# Patient Record
Sex: FEMALE | Race: WHITE | NOT HISPANIC OR LATINO | URBAN - METROPOLITAN AREA
[De-identification: names, ages, dates, MRNs, and addresses within clinical notes are randomized per-mention and may not be internally consistent; named-entity substitution may affect disease eponyms.]

---

## 2017-03-03 ENCOUNTER — OUTPATIENT (OUTPATIENT)
Dept: OUTPATIENT SERVICES | Facility: HOSPITAL | Age: 52
LOS: 1 days | Discharge: HOME | End: 2017-03-03

## 2017-06-27 DIAGNOSIS — R92.8 OTHER ABNORMAL AND INCONCLUSIVE FINDINGS ON DIAGNOSTIC IMAGING OF BREAST: ICD-10-CM

## 2017-11-17 PROBLEM — Z00.00 ENCOUNTER FOR PREVENTIVE HEALTH EXAMINATION: Status: ACTIVE | Noted: 2017-11-17

## 2017-11-24 ENCOUNTER — OUTPATIENT (OUTPATIENT)
Dept: OUTPATIENT SERVICES | Facility: HOSPITAL | Age: 52
LOS: 1 days | Discharge: HOME | End: 2017-11-24

## 2017-11-24 ENCOUNTER — APPOINTMENT (OUTPATIENT)
Dept: HEMATOLOGY ONCOLOGY | Facility: CLINIC | Age: 52
End: 2017-11-24

## 2017-11-24 VITALS
HEART RATE: 73 BPM | RESPIRATION RATE: 14 BRPM | WEIGHT: 164 LBS | TEMPERATURE: 96.9 F | DIASTOLIC BLOOD PRESSURE: 90 MMHG | SYSTOLIC BLOOD PRESSURE: 121 MMHG | BODY MASS INDEX: 30.96 KG/M2 | HEIGHT: 61 IN

## 2017-11-24 DIAGNOSIS — I10 ESSENTIAL (PRIMARY) HYPERTENSION: ICD-10-CM

## 2017-11-24 DIAGNOSIS — Z80.8 FAMILY HISTORY OF MALIGNANT NEOPLASM OF OTHER ORGANS OR SYSTEMS: ICD-10-CM

## 2017-11-24 DIAGNOSIS — Z78.9 OTHER SPECIFIED HEALTH STATUS: ICD-10-CM

## 2017-11-24 DIAGNOSIS — K21.9 GASTRO-ESOPHAGEAL REFLUX DISEASE W/OUT ESOPHAGITIS: ICD-10-CM

## 2017-11-24 DIAGNOSIS — E78.00 PURE HYPERCHOLESTEROLEMIA, UNSPECIFIED: ICD-10-CM

## 2017-11-24 LAB
BASOPHILS # BLD: 0.03 TH/MM3
BASOPHILS NFR BLD: 0.5 %
EOSINOPHIL # BLD: 0.75 TH/MM3
EOSINOPHIL NFR BLD: 11.5 %
ERYTHROCYTE [DISTWIDTH] IN BLOOD BY AUTOMATED COUNT: 15.2 %
GRANULOCYTES # BLD: 2.88 TH/MM3
GRANULOCYTES NFR BLD: 43.9 %
HCT VFR BLD AUTO: 38.5 %
HGB BLD-MCNC: 12.4 G/DL
IMM GRANULOCYTES # BLD: 0.01 TH/MM3
IMM GRANULOCYTES NFR BLD: 0.2 %
LYMPHOCYTES # BLD: 2.18 TH/MM3
LYMPHOCYTES NFR BLD: 33.3 %
MCH RBC QN AUTO: 26.7 PG
MCHC RBC AUTO-ENTMCNC: 32.2 G/DL
MCV RBC AUTO: 83 FL
MONOCYTES # BLD: 0.69 TH/MM3
MONOCYTES NFR BLD: 10.6 %
PLATELET # BLD: 242 TH/MM3
PMV BLD AUTO: 10.2 FL
RBC # BLD AUTO: 4.64 MIL/MM3
WBC # BLD: 6.54 TH/MM3

## 2017-11-27 DIAGNOSIS — D47.2 MONOCLONAL GAMMOPATHY: ICD-10-CM

## 2017-11-27 DIAGNOSIS — E61.1 IRON DEFICIENCY: ICD-10-CM

## 2017-11-27 LAB
ALBUMIN MFR SERPL ELPH: 64.3 %
ALBUMIN SERPL ELPH-MCNC: 4.3 G/DL
ALBUMIN SERPL-MCNC: 4 G/DL
ALBUMIN/GLOB SERPL ELPH: 1.8 ZZ
ALBUMIN/GLOB SERPL: 2.11
ALP SERPL-CCNC: 74 IU/L
ALPHA1 GLOB MFR SERPL ELPH: 4.5 %
ALPHA1 GLOB SERPL ELPH-MCNC: 0.3 G/DL
ALPHA2 GLOB MFR SERPL ELPH: 10.3 %
ALPHA2 GLOB SERPL ELPH-MCNC: 0.7 G/DL
ALT SERPL-CCNC: 22 IU/L
ANION GAP SERPL CALC-SCNC: 8 MEQ/L
AST SERPL-CCNC: 21 IU/L
B-GLOBULIN SERPL ELPH-MCNC: 0.7 G/DL
BETA1 GLOB MFR SERPL ELPH: 10.7 %
BILIRUB SERPL-MCNC: 0.2 MG/DL
BUN SERPL-MCNC: 11 MG/DL
BUN/CREAT SERPL: 15.5 %
CALCIUM SERPL-MCNC: 10 MG/DL
CHLORIDE SERPL-SCNC: 106 MEQ/L
CO2 SERPL-SCNC: 31 MEQ/L
CREAT SERPL-MCNC: 0.71 MG/DL
GAMMA GLOB MFR SERPL ELPH: 10.2 %
GAMMA GLOB SERPL ELPH-MCNC: 0.7 G/DL
GFR SERPL CREATININE-BSD FRML MDRD: 86
GLUCOSE SERPL-MCNC: 107 MG/DL
IGA FLD-MCNC: 59 MG/DL
IGG FLD-MCNC: 681 MG/DL
IGM SERPL-MCNC: 32 MG/DL
INTERPRETATION SERPL IFE-IMP: NOT DETECTED
KAPPA LC FREE SER-MCNC: 18 MG/L
KAPPA LC FREE/LAMBDA FREE SER: 1.51
LACTATE DEHYDROGENASE (NORTH): 153 IU/L
LAMBDA LC FREE SERPL-MCNC: 11.9 MG/L
POTASSIUM SERPL-SCNC: 4.8 MMOL/L
PROT PATTERN SERPL ELPH-IMP: 6.7 G/DL
PROT PATTERN SERPL ELPH-IMP: NORMAL
PROT SERPL-MCNC: 5.9 G/DL
PROT SERPL-MCNC: 6.7 G/DL
SODIUM SERPL-SCNC: 145 MEQ/L

## 2017-11-29 LAB
PROT 24H UR-MRATE: 127.75 MG/24 HR
PROT UR-MCNC: 7 MG/DL
SPECIMEN VOL 24H UR: 1825 ML

## 2018-02-28 ENCOUNTER — OUTPATIENT (OUTPATIENT)
Dept: OUTPATIENT SERVICES | Facility: HOSPITAL | Age: 53
LOS: 1 days | Discharge: HOME | End: 2018-02-28

## 2018-03-01 DIAGNOSIS — N95.9 UNSPECIFIED MENOPAUSAL AND PERIMENOPAUSAL DISORDER: ICD-10-CM

## 2018-03-01 DIAGNOSIS — Z13.820 ENCOUNTER FOR SCREENING FOR OSTEOPOROSIS: ICD-10-CM

## 2018-03-05 ENCOUNTER — OUTPATIENT (OUTPATIENT)
Dept: OUTPATIENT SERVICES | Facility: HOSPITAL | Age: 53
LOS: 1 days | Discharge: HOME | End: 2018-03-05

## 2018-03-05 DIAGNOSIS — Z12.31 ENCOUNTER FOR SCREENING MAMMOGRAM FOR MALIGNANT NEOPLASM OF BREAST: ICD-10-CM

## 2018-03-15 ENCOUNTER — OUTPATIENT (OUTPATIENT)
Dept: OUTPATIENT SERVICES | Facility: HOSPITAL | Age: 53
LOS: 1 days | Discharge: HOME | End: 2018-03-15

## 2018-03-15 VITALS
TEMPERATURE: 97 F | OXYGEN SATURATION: 97 % | WEIGHT: 155.43 LBS | SYSTOLIC BLOOD PRESSURE: 138 MMHG | DIASTOLIC BLOOD PRESSURE: 75 MMHG | RESPIRATION RATE: 18 BRPM | HEIGHT: 61 IN | HEART RATE: 64 BPM

## 2018-03-15 DIAGNOSIS — G56.01 CARPAL TUNNEL SYNDROME, RIGHT UPPER LIMB: ICD-10-CM

## 2018-03-15 DIAGNOSIS — M65.311 TRIGGER THUMB, RIGHT THUMB: ICD-10-CM

## 2018-03-15 DIAGNOSIS — Z90.49 ACQUIRED ABSENCE OF OTHER SPECIFIED PARTS OF DIGESTIVE TRACT: Chronic | ICD-10-CM

## 2018-03-15 DIAGNOSIS — Z01.818 ENCOUNTER FOR OTHER PREPROCEDURAL EXAMINATION: ICD-10-CM

## 2018-03-15 DIAGNOSIS — Z98.890 OTHER SPECIFIED POSTPROCEDURAL STATES: Chronic | ICD-10-CM

## 2018-03-15 LAB
ALBUMIN SERPL ELPH-MCNC: 4 G/DL — SIGNIFICANT CHANGE UP (ref 3–5.5)
ALP SERPL-CCNC: 81 U/L — SIGNIFICANT CHANGE UP (ref 30–115)
ALT FLD-CCNC: 21 U/L — SIGNIFICANT CHANGE UP (ref 0–41)
ANION GAP SERPL CALC-SCNC: 11 MMOL/L — SIGNIFICANT CHANGE UP (ref 7–14)
APTT BLD: 30.6 SEC — SIGNIFICANT CHANGE UP (ref 27–39.2)
AST SERPL-CCNC: 19 U/L — SIGNIFICANT CHANGE UP (ref 0–41)
BILIRUB SERPL-MCNC: 0.5 MG/DL — SIGNIFICANT CHANGE UP (ref 0.2–1.2)
BUN SERPL-MCNC: 10 MG/DL — SIGNIFICANT CHANGE UP (ref 10–20)
CALCIUM SERPL-MCNC: 9.7 MG/DL — SIGNIFICANT CHANGE UP (ref 8.5–10.1)
CHLORIDE SERPL-SCNC: 104 MMOL/L — SIGNIFICANT CHANGE UP (ref 98–110)
CO2 SERPL-SCNC: 29 MMOL/L — SIGNIFICANT CHANGE UP (ref 17–32)
CREAT SERPL-MCNC: 0.8 MG/DL — SIGNIFICANT CHANGE UP (ref 0.7–1.5)
GLUCOSE SERPL-MCNC: 105 MG/DL — SIGNIFICANT CHANGE UP (ref 70–110)
HCT VFR BLD CALC: 39.2 % — SIGNIFICANT CHANGE UP (ref 37–47)
HGB BLD-MCNC: 12.8 G/DL — SIGNIFICANT CHANGE UP (ref 12–16)
INR BLD: 1.06 RATIO — SIGNIFICANT CHANGE UP (ref 0.65–1.3)
MCHC RBC-ENTMCNC: 27.1 PG — SIGNIFICANT CHANGE UP (ref 27–31)
MCHC RBC-ENTMCNC: 32.7 G/DL — SIGNIFICANT CHANGE UP (ref 32–37)
MCV RBC AUTO: 83.1 FL — SIGNIFICANT CHANGE UP (ref 81–99)
NRBC # BLD: 0 /100 WBCS — SIGNIFICANT CHANGE UP (ref 0–0)
PLATELET # BLD AUTO: 240 K/UL — SIGNIFICANT CHANGE UP (ref 130–400)
POTASSIUM SERPL-MCNC: 4.8 MMOL/L — SIGNIFICANT CHANGE UP (ref 3.5–5)
POTASSIUM SERPL-SCNC: 4.8 MMOL/L — SIGNIFICANT CHANGE UP (ref 3.5–5)
PROT SERPL-MCNC: 6.1 G/DL — SIGNIFICANT CHANGE UP (ref 6–8)
PROTHROM AB SERPL-ACNC: 11.5 SEC — SIGNIFICANT CHANGE UP (ref 9.95–12.87)
RBC # BLD: 4.72 M/UL — SIGNIFICANT CHANGE UP (ref 4.2–5.4)
RBC # FLD: 13.5 % — SIGNIFICANT CHANGE UP (ref 11.5–14.5)
SODIUM SERPL-SCNC: 144 MMOL/L — SIGNIFICANT CHANGE UP (ref 135–146)
WBC # BLD: 8.26 K/UL — SIGNIFICANT CHANGE UP (ref 4.8–10.8)
WBC # FLD AUTO: 8.26 K/UL — SIGNIFICANT CHANGE UP (ref 4.8–10.8)

## 2018-03-15 NOTE — H&P PST ADULT - REASON FOR ADMISSION
Pt denies cp palp uri cough dysuria or sob. ET 1 FOS denies SOB . PT denies any open wounds, drainage or rashes.scheduled for carpal; tunnel release and trigger release. pt had small bowel endoscopy 3/14/18 to follow up for hx of low iron levels. Pt denies cp palp uri cough dysuria or sob. ET 1 FOS denies SOB . PT denies any open wounds, drainage or rashes. scheduled for carpal  tunnel release and trigger release. pt had small bowel endoscopy 3/14/18 to follow up for hx of low iron levels.  pt was advised by surgeon clearance appt with md reaves and was advised by PAST to f/u regarding slight sore throat started 2 days ago. no fever or cough noted. pt compliant

## 2018-03-15 NOTE — H&P PST ADULT - PSH
H/O hysterectomy for benign disease    History of surgery  loomis abida placement  S/P cholecystectomy

## 2018-03-28 ENCOUNTER — OUTPATIENT (OUTPATIENT)
Dept: OUTPATIENT SERVICES | Facility: HOSPITAL | Age: 53
LOS: 1 days | Discharge: HOME | End: 2018-03-28

## 2018-03-28 VITALS
HEART RATE: 71 BPM | WEIGHT: 155.43 LBS | SYSTOLIC BLOOD PRESSURE: 149 MMHG | RESPIRATION RATE: 20 BRPM | OXYGEN SATURATION: 97 % | TEMPERATURE: 98 F | HEIGHT: 61 IN | DIASTOLIC BLOOD PRESSURE: 73 MMHG

## 2018-03-28 VITALS
RESPIRATION RATE: 17 BRPM | HEART RATE: 63 BPM | OXYGEN SATURATION: 98 % | SYSTOLIC BLOOD PRESSURE: 136 MMHG | DIASTOLIC BLOOD PRESSURE: 69 MMHG

## 2018-03-28 DIAGNOSIS — Z90.49 ACQUIRED ABSENCE OF OTHER SPECIFIED PARTS OF DIGESTIVE TRACT: Chronic | ICD-10-CM

## 2018-03-28 DIAGNOSIS — Z98.890 OTHER SPECIFIED POSTPROCEDURAL STATES: Chronic | ICD-10-CM

## 2018-03-28 RX ORDER — ONDANSETRON 8 MG/1
4 TABLET, FILM COATED ORAL ONCE
Qty: 0 | Refills: 0 | Status: DISCONTINUED | OUTPATIENT
Start: 2018-03-28 | End: 2018-04-12

## 2018-03-28 RX ORDER — IBUPROFEN 200 MG
1 TABLET ORAL
Qty: 20 | Refills: 0 | OUTPATIENT
Start: 2018-03-28

## 2018-03-28 RX ORDER — SODIUM CHLORIDE 9 MG/ML
1000 INJECTION, SOLUTION INTRAVENOUS
Qty: 0 | Refills: 0 | Status: DISCONTINUED | OUTPATIENT
Start: 2018-03-28 | End: 2018-04-12

## 2018-03-28 RX ORDER — ACETAMINOPHEN 500 MG
650 TABLET ORAL ONCE
Qty: 0 | Refills: 0 | Status: DISCONTINUED | OUTPATIENT
Start: 2018-03-28 | End: 2018-04-12

## 2018-03-28 RX ORDER — OXYCODONE AND ACETAMINOPHEN 5; 325 MG/1; MG/1
1 TABLET ORAL ONCE
Qty: 0 | Refills: 0 | Status: DISCONTINUED | OUTPATIENT
Start: 2018-03-28 | End: 2018-03-28

## 2018-03-28 RX ADMIN — SODIUM CHLORIDE 100 MILLILITER(S): 9 INJECTION, SOLUTION INTRAVENOUS at 08:36

## 2018-03-28 NOTE — ASU DISCHARGE PLAN (ADULT/PEDIATRIC). - SPECIAL INSTRUCTIONS
DIET:    Resume normal diet  No alcoholic  beverages for 24 hours or if on prescribed narcotic pain medications.    MEDICATION:    Resume your preoperative oral medications.  Check with your physician before starting aspirin, Coumadin, or other blood thinners.  Prescriptions given to you - take as directed.      ACTIVITY:    Rest today and limit your activities for 24 hours.  Do not drive or operate machinery for 24 hours - if you received anesthesia.  When taking pain medication, be careful as you walk or climb stairs.  It is not advisable to drive while taking prescribed pain medication.    SPECIAL INSTRUCTIONS:    _x____ Elevate operative site above heart level or as directed.  _x____ Apply ice to operative site as directed.  _____ Use  sling as directed.  ___x__ Exercise fingers.    DRESSING CARE:    ___x__ You may change the dressing 4 days. Keep wound covered with band-aids.  _____ Do not change the dressing until your doctor see you.  _____ You can loosen or rewrap the dressing.  _____  Keep dressing clean and dry.  _____ You may shower in _____ day(s) with the extremity covered by a plastic bag.  __x___ OK to wash hand , including showers, in ___4__ day(s).    ADDITIONAL  INFORMATION:    Post operative visit should be scheduled for next week.  If you are not aware of visit please contact office.  If you have any questions or concerns call office at      Notify your doctor if you develop   Fever  Excessive Swelling  Chills   Drainage   Pain not controlled by medication  Persistent numbness in hand or fingers    If an Emergency arises call 911 and/or go to the Emergency Room

## 2018-03-28 NOTE — PRE-ANESTHESIA EVALUATION ADULT - NSANTHOSAYNRD_GEN_A_CORE
No. NIDHI screening performed.  STOP BANG Legend: 0-2 = LOW Risk; 3-4 = INTERMEDIATE Risk; 5-8 = HIGH Risk

## 2018-03-28 NOTE — BRIEF OPERATIVE NOTE - POST-OP DX
Carpal tunnel syndrome of right wrist  03/28/2018    Active  Skinny Kern  Trigger thumb, right thumb  03/28/2018    Active  Skinny Kern

## 2018-03-28 NOTE — BRIEF OPERATIVE NOTE - PRE-OP DX
Carpal tunnel syndrome of right wrist  03/28/2018    Active  Skinny Kern  Trigger finger of right thumb  03/28/2018    Active  Skinny Kern

## 2018-03-28 NOTE — PACU DISCHARGE NOTE - COMMENTS
PACU vital signs are:  HR:        BP:    /       O2sat:    %     RR:        Temp:              Please discharge patient when criteria met. PACU vital signs are:  HR:   62     BP: 117   / 72      O2sat:  96  %     RR:18        Temp:  97.7            Please discharge patient when criteria met.

## 2018-03-28 NOTE — BRIEF OPERATIVE NOTE - PROCEDURE
<<-----Click on this checkbox to enter Procedure Release of right trigger thumb  03/28/2018    Active  NAJMA  Carpal tunnel release, right  03/28/2018    Active  NAJMA

## 2018-03-30 DIAGNOSIS — Z88.2 ALLERGY STATUS TO SULFONAMIDES: ICD-10-CM

## 2018-03-30 DIAGNOSIS — G56.01 CARPAL TUNNEL SYNDROME, RIGHT UPPER LIMB: ICD-10-CM

## 2018-03-30 DIAGNOSIS — M65.311 TRIGGER THUMB, RIGHT THUMB: ICD-10-CM

## 2018-03-30 DIAGNOSIS — I10 ESSENTIAL (PRIMARY) HYPERTENSION: ICD-10-CM

## 2018-10-25 PROBLEM — Z87.39 PERSONAL HISTORY OF OTHER DISEASES OF THE MUSCULOSKELETAL SYSTEM AND CONNECTIVE TISSUE: Chronic | Status: ACTIVE | Noted: 2018-03-15

## 2018-10-25 PROBLEM — D64.9 ANEMIA, UNSPECIFIED: Chronic | Status: ACTIVE | Noted: 2018-03-15

## 2018-10-25 PROBLEM — I10 ESSENTIAL (PRIMARY) HYPERTENSION: Chronic | Status: ACTIVE | Noted: 2018-03-15

## 2018-11-06 ENCOUNTER — APPOINTMENT (OUTPATIENT)
Dept: PLASTIC SURGERY | Facility: CLINIC | Age: 53
End: 2018-11-06
Payer: COMMERCIAL

## 2018-11-06 VITALS — HEIGHT: 60 IN | WEIGHT: 159 LBS | BODY MASS INDEX: 31.22 KG/M2

## 2018-11-06 DIAGNOSIS — D22.39 MELANOCYTIC NEVI OF OTHER PARTS OF FACE: ICD-10-CM

## 2018-11-06 DIAGNOSIS — Z86.69 PERSONAL HISTORY OF OTHER DISEASES OF THE NERVOUS SYSTEM AND SENSE ORGANS: ICD-10-CM

## 2018-11-06 PROCEDURE — 99203 OFFICE O/P NEW LOW 30 MIN: CPT

## 2018-11-30 ENCOUNTER — APPOINTMENT (OUTPATIENT)
Dept: PLASTIC SURGERY | Facility: CLINIC | Age: 53
End: 2018-11-30
Payer: COMMERCIAL

## 2018-11-30 PROCEDURE — 12051 INTMD RPR FACE/MM 2.5 CM/<: CPT | Mod: 59

## 2018-11-30 PROCEDURE — 11440 EXC FACE-MM B9+MARG 0.5 CM/<: CPT

## 2018-12-07 ENCOUNTER — APPOINTMENT (OUTPATIENT)
Dept: PLASTIC SURGERY | Facility: CLINIC | Age: 53
End: 2018-12-07
Payer: COMMERCIAL

## 2018-12-07 DIAGNOSIS — D48.5 NEOPLASM OF UNCERTAIN BEHAVIOR OF SKIN: ICD-10-CM

## 2018-12-07 PROCEDURE — 99024 POSTOP FOLLOW-UP VISIT: CPT

## 2019-01-22 ENCOUNTER — APPOINTMENT (OUTPATIENT)
Dept: UROLOGY | Facility: CLINIC | Age: 54
End: 2019-01-22
Payer: COMMERCIAL

## 2019-01-22 LAB
BILIRUB UR QL STRIP: NORMAL
CLARITY UR: CLEAR
COLLECTION METHOD: NORMAL
GLUCOSE UR-MCNC: NORMAL
HCG UR QL: NORMAL EU/DL
HGB UR QL STRIP.AUTO: NORMAL
KETONES UR-MCNC: NORMAL
LEUKOCYTE ESTERASE UR QL STRIP: 75
NITRITE UR QL STRIP: NORMAL
PH UR STRIP: 7
PROT UR STRIP-MCNC: NORMAL
SP GR UR STRIP: 1

## 2019-01-22 PROCEDURE — 99204 OFFICE O/P NEW MOD 45 MIN: CPT

## 2019-01-22 PROCEDURE — 81003 URINALYSIS AUTO W/O SCOPE: CPT | Mod: QW

## 2019-01-22 NOTE — PHYSICAL EXAM
[General Appearance - Well Developed] : well developed [General Appearance - Well Nourished] : well nourished [Normal Appearance] : normal appearance [Well Groomed] : well groomed [General Appearance - In No Acute Distress] : no acute distress [Edema] : no peripheral edema [Respiration, Rhythm And Depth] : normal respiratory rhythm and effort [Exaggerated Use Of Accessory Muscles For Inspiration] : no accessory muscle use [Abdomen Soft] : soft [Abdomen Tenderness] : non-tender [Costovertebral Angle Tenderness] : no ~M costovertebral angle tenderness [Normal Station and Gait] : the gait and station were normal for the patient's age [] : no rash [No Focal Deficits] : no focal deficits [Oriented To Time, Place, And Person] : oriented to person, place, and time [Affect] : the affect was normal [Mood] : the mood was normal [Not Anxious] : not anxious [No Palpable Adenopathy] : no palpable adenopathy [FreeTextEntry1] : deferred to gyn

## 2019-01-22 NOTE — HISTORY OF PRESENT ILLNESS
[Nocturia] : nocturia [None] : None [FreeTextEntry1] : 53 y.o female with h/o nephrolithiasis\par previously followed by Dr. Mauricio- now needs to change doctors due to insurance reasons\par pt asymptomatic at present time\par pt s/p left ESWL 11/21/18\par pt states she did not pass any fragments\par on thera- lith by Dr. Mauricio\par all data reviewed\par voiding well\par not \par no prior pregnancies\par not working\par \par states she saw a nephrologist for proteinuria but states she will not go back to that doctor\par all reports reviewed from Dr. Mauricio\par KUB- 11/30/18- (post left ESWL) few punctate stones bilaterally, up to 3mm in left kidney\par  [Urinary Urgency] : no urinary urgency [Urinary Frequency] : no urinary frequency [Weak Stream] : no weak stream [Dysuria] : no dysuria [Hematuria - Gross] : no gross hematuria [Abdominal Pain] : no abdominal pain [Flank Pain] : no flank pain [Fever] : no fever [Anorexia] : no anorexia

## 2019-01-22 NOTE — ASSESSMENT
[FreeTextEntry1] : 1. small bilateral nephrolithiasis- up to 3mm- s/p left ESWL\par \par -low Na+, diet\par -increase liquid intake\par -referral to Dr. Montoya or Dr. Fajardo for proteinuria and metabolic management\par -KUB in 12 months\par -rto 12 months\par

## 2019-04-03 ENCOUNTER — OUTPATIENT (OUTPATIENT)
Dept: OUTPATIENT SERVICES | Facility: HOSPITAL | Age: 54
LOS: 1 days | Discharge: HOME | End: 2019-04-03

## 2019-04-03 VITALS
HEIGHT: 61 IN | RESPIRATION RATE: 18 BRPM | DIASTOLIC BLOOD PRESSURE: 79 MMHG | WEIGHT: 160.06 LBS | HEART RATE: 57 BPM | OXYGEN SATURATION: 99 % | TEMPERATURE: 98 F | SYSTOLIC BLOOD PRESSURE: 148 MMHG

## 2019-04-03 VITALS
HEART RATE: 69 BPM | OXYGEN SATURATION: 96 % | DIASTOLIC BLOOD PRESSURE: 71 MMHG | SYSTOLIC BLOOD PRESSURE: 127 MMHG | RESPIRATION RATE: 19 BRPM

## 2019-04-03 DIAGNOSIS — Z90.49 ACQUIRED ABSENCE OF OTHER SPECIFIED PARTS OF DIGESTIVE TRACT: Chronic | ICD-10-CM

## 2019-04-03 DIAGNOSIS — Z98.890 OTHER SPECIFIED POSTPROCEDURAL STATES: Chronic | ICD-10-CM

## 2019-04-03 RX ORDER — METOPROLOL TARTRATE 50 MG
0 TABLET ORAL
Qty: 0 | Refills: 0 | COMMUNITY

## 2019-04-03 RX ORDER — CHOLECALCIFEROL (VITAMIN D3) 125 MCG
1 CAPSULE ORAL
Qty: 0 | Refills: 0 | COMMUNITY

## 2019-04-03 RX ORDER — MULTIVIT-MIN/FERROUS GLUCONATE 9 MG/15 ML
1 LIQUID (ML) ORAL
Qty: 0 | Refills: 0 | COMMUNITY

## 2019-04-03 RX ORDER — FERROUS SULFATE 325(65) MG
0 TABLET ORAL
Qty: 0 | Refills: 0 | COMMUNITY

## 2019-04-03 RX ORDER — LEVETIRACETAM 250 MG/1
1 TABLET, FILM COATED ORAL
Qty: 0 | Refills: 0 | COMMUNITY

## 2019-04-03 RX ORDER — PYRIDOXINE HCL (VITAMIN B6) 100 MG
0 TABLET ORAL
Qty: 0 | Refills: 0 | COMMUNITY

## 2019-04-03 NOTE — ASU DISCHARGE PLAN (ADULT/PEDIATRIC) - ASU DC SPECIAL INSTRUCTIONSFT
DIET:    Resume normal diet  No alcoholic  beverages for 24 hours or if on prescribed narcotic pain medications.    MEDICATION:    Resume your preoperative oral medications.  Check with your physician before starting aspirin, Coumadin, or other blood thinners.  Prescriptions given to you - take as directed.      ACTIVITY:    Rest today and limit your activities for 24 hours.  Do not drive or operate machinery for 24 hours - if you received anesthesia.  When taking pain medication, be careful as you walk or climb stairs.  It is not advisable to drive while taking prescribed pain medication.    SPECIAL INSTRUCTIONS:    __x___ Elevate operative site above heart level or as directed.  _x____ Apply ice to operative site as directed.  _____ Use  sling as directed.  _x____ Exercise fingers.    DRESSING CARE:    _x____ You may change the dressing 4 days. Keep wound covered with band-aids.  _____ Do not change the dressing until your doctor see you.  _____ You can loosen or rewrap the dressing.  _____  Keep dressing clean and dry.  _____ You may shower in _____ day(s) with the extremity covered by a plastic bag.  ___x__ OK to wash hand , including showers, in ___4__ day(s).    ADDITIONAL  INFORMATION:    Post operative visit should be scheduled for next week.  If you are not aware of visit please contact office.  If you have any questions or concerns call office at      Notify your doctor if you develop   Fever  Excessive Swelling  Chills   Drainage   Pain not controlled by medication  Persistent numbness in hand or fingers    If an Emergency arises call 911 and/or go to the Emergency Room

## 2019-04-08 DIAGNOSIS — I10 ESSENTIAL (PRIMARY) HYPERTENSION: ICD-10-CM

## 2019-04-08 DIAGNOSIS — M65.331 TRIGGER FINGER, RIGHT MIDDLE FINGER: ICD-10-CM

## 2019-04-08 DIAGNOSIS — Z88.2 ALLERGY STATUS TO SULFONAMIDES: ICD-10-CM

## 2019-04-29 ENCOUNTER — OUTPATIENT (OUTPATIENT)
Dept: OUTPATIENT SERVICES | Facility: HOSPITAL | Age: 54
LOS: 1 days | Discharge: HOME | End: 2019-04-29
Payer: COMMERCIAL

## 2019-04-29 DIAGNOSIS — Z12.31 ENCOUNTER FOR SCREENING MAMMOGRAM FOR MALIGNANT NEOPLASM OF BREAST: ICD-10-CM

## 2019-04-29 DIAGNOSIS — Z98.890 OTHER SPECIFIED POSTPROCEDURAL STATES: Chronic | ICD-10-CM

## 2019-04-29 DIAGNOSIS — Z90.49 ACQUIRED ABSENCE OF OTHER SPECIFIED PARTS OF DIGESTIVE TRACT: Chronic | ICD-10-CM

## 2019-04-29 PROCEDURE — 77067 SCR MAMMO BI INCL CAD: CPT | Mod: 26

## 2019-04-29 PROCEDURE — 77063 BREAST TOMOSYNTHESIS BI: CPT | Mod: 26

## 2019-05-28 ENCOUNTER — OUTPATIENT (OUTPATIENT)
Dept: OUTPATIENT SERVICES | Facility: HOSPITAL | Age: 54
LOS: 1 days | Discharge: HOME | End: 2019-05-28

## 2019-05-28 DIAGNOSIS — M65.331 TRIGGER FINGER, RIGHT MIDDLE FINGER: ICD-10-CM

## 2019-05-28 DIAGNOSIS — Z90.49 ACQUIRED ABSENCE OF OTHER SPECIFIED PARTS OF DIGESTIVE TRACT: Chronic | ICD-10-CM

## 2019-05-28 DIAGNOSIS — Z98.890 OTHER SPECIFIED POSTPROCEDURAL STATES: Chronic | ICD-10-CM

## 2020-01-07 ENCOUNTER — MESSAGE (OUTPATIENT)
Age: 55
End: 2020-01-07

## 2020-02-20 ENCOUNTER — APPOINTMENT (OUTPATIENT)
Dept: UROLOGY | Facility: CLINIC | Age: 55
End: 2020-02-20
Payer: MEDICARE

## 2020-02-20 VITALS — BODY MASS INDEX: 31.22 KG/M2 | HEIGHT: 60 IN | WEIGHT: 159 LBS

## 2020-02-20 DIAGNOSIS — N20.0 CALCULUS OF KIDNEY: ICD-10-CM

## 2020-02-20 PROCEDURE — 99214 OFFICE O/P EST MOD 30 MIN: CPT

## 2020-06-04 ENCOUNTER — OUTPATIENT (OUTPATIENT)
Dept: OUTPATIENT SERVICES | Facility: HOSPITAL | Age: 55
LOS: 1 days | Discharge: HOME | End: 2020-06-04
Payer: MEDICARE

## 2020-06-04 DIAGNOSIS — Z98.890 OTHER SPECIFIED POSTPROCEDURAL STATES: Chronic | ICD-10-CM

## 2020-06-04 DIAGNOSIS — Z12.31 ENCOUNTER FOR SCREENING MAMMOGRAM FOR MALIGNANT NEOPLASM OF BREAST: ICD-10-CM

## 2020-06-04 DIAGNOSIS — Z90.49 ACQUIRED ABSENCE OF OTHER SPECIFIED PARTS OF DIGESTIVE TRACT: Chronic | ICD-10-CM

## 2020-06-04 PROCEDURE — 77063 BREAST TOMOSYNTHESIS BI: CPT | Mod: 26

## 2020-06-04 PROCEDURE — 77067 SCR MAMMO BI INCL CAD: CPT | Mod: 26

## 2021-02-03 ENCOUNTER — APPOINTMENT (OUTPATIENT)
Dept: UROLOGY | Facility: CLINIC | Age: 56
End: 2021-02-03

## 2021-06-28 ENCOUNTER — OUTPATIENT (OUTPATIENT)
Dept: OUTPATIENT SERVICES | Facility: HOSPITAL | Age: 56
LOS: 1 days | Discharge: HOME | End: 2021-06-28
Payer: MEDICARE

## 2021-06-28 DIAGNOSIS — Z98.890 OTHER SPECIFIED POSTPROCEDURAL STATES: Chronic | ICD-10-CM

## 2021-06-28 DIAGNOSIS — Z12.31 ENCOUNTER FOR SCREENING MAMMOGRAM FOR MALIGNANT NEOPLASM OF BREAST: ICD-10-CM

## 2021-06-28 DIAGNOSIS — Z90.49 ACQUIRED ABSENCE OF OTHER SPECIFIED PARTS OF DIGESTIVE TRACT: Chronic | ICD-10-CM

## 2021-06-28 PROCEDURE — 77063 BREAST TOMOSYNTHESIS BI: CPT | Mod: 26

## 2021-06-28 PROCEDURE — 77067 SCR MAMMO BI INCL CAD: CPT | Mod: 26

## 2021-09-22 NOTE — H&P PST ADULT - ENMT
[facility], [facility]  [address] [place], [facility]  Phone: [phone]   Fax:     [phone]    Physical Therapy Treatment Note/ Progress Report:     Date:  2021    Patient Name:  Shara Hancock    :  1976  MRN: [id number]    Pertinent Medical History:      Medical/Treatment Diagnosis Information:  · Diagnosis: Chronic LBP  · Treatment Diagnosis: LBP radiating down to both feet    Insurance/Certification information:   [facility]  Physician Information:   Sara Blevins [address] Cantrell S of care signed (Y/N): Faxed    Date of Patient follow up with Physician:      Progress Report: []  Yes  [x]  No     Date Range for reporting period:  Beginnin2021  Ending:    Progress report due (10 Rx/or 30 days whichever is less):     Recertification due (POC duration/ or 90 days whichever is less):     Visit # POC/ Insurance Allowable Auth Needed    96 units by 21 [x]Yes    []No     Functional Outcomes Measure:   Date Assessed: at eval  Test: [place]  Score: 40    Pain level:  8/10     History of Injury:   H/o LBP ~ 15 yrs    SUBJECTIVE:  C/o LBP radiating to both feet with \"tingling\" in both feet  21 C/o of B LBP, constant N/T B toes L>R. Sore x 3 days after first visit. OBJECTIVE:    Observation:    Test measurements:      RESTRICTIONS/PRECAUTIONS:     Exercises/Interventions:     Therapeutic Ex (05133)   Min: 25 Resistance/Reps Notes/Cues        GSS slant  /HSS 30 sec x 2 ea     Standing abd 0# x 10 R/L         Ext in stand 1x10         Seated reclines x10         Prone props 1 min x 2  N/T in B toes the same. Prone ext via table     Prone hip ext x10 R/L                   Therapeutic Activity (98158) Min:  10          Reviewed home exer, posture and body mechanics Issued booklets   discussed  Lifting/ pushing pulling , ADL's , transfers supine to sit.                NMR re-education (49213)   Min:                         Manual Intervention ([id number]) Min:  5          Prone quad stretch 30 sec x 2 R/L    Prone B LE traction 15 sec x 6 Relief LBP, no change N/t toes                  Modalities  Min:           E-stim     (prone) IFC with MH x 15 min Decreased pain after Rx. Other Therapeutic Activities:  Pt was educated on PT POC, Diagnosis, Prognosis, pathomechanics as well as frequency and duration of scheduling future physical therapy appointments. Time was also taken on this day to answer all patient questions and participation in PT. Reviewed appointment policy in detail with patient and patient verbalized understanding. Home Exercise Program: Patient instructed in the following for HEP:   . Patient verbalized/demonstrated understanding and was issued written handout. Therapeutic Exercise and NMR EXR  [] (45874) Provided verbal/tactile cueing for activities related to strengthening, flexibility, endurance, ROM  for improvements in proximal hip and core control with self care, mobility, lifting and ambulation.  [] (11166) Provided verbal/tactile cueing for activities related to improving balance, coordination, kinesthetic sense, posture, motor skill, proprioception  to assist with core control in self care, mobility, lifting, and ambulation.      Therapeutic Activities:    [] (78540 or 18473) Provided verbal/tactile cueing for activities related to improving balance, coordination, kinesthetic sense, posture, motor skill, proprioception and motor activation to allow for proper function  with self care and ADLs  [] (04227) Provided training and instruction to the patient for proper core and proximal hip recruitment and positioning with ambulation re-education     Home Exercise Program:    [] (71201) Reviewed/Progressed HEP activities related to strengthening, flexibility, endurance, ROM of core, proximal hip and LE for functional self-care, mobility, lifting and ambulation   [] (37586) Reviewed/Progressed HEP activities related to improving balance, coordination, kinesthetic sense, posture, motor skill, proprioception of core, proximal hip and LE for self care, mobility, lifting, and ambulation      Manual Treatments:  PROM / STM / Oscillations-Mobs:  G-I, II, III, IV (PA's, Inf., Post.)  [] (66344) Provided manual therapy to mobilize proximal hip and LS spine soft tissue/joints for the purpose of modulating pain, promoting relaxation,  increasing ROM, reducing/eliminating soft tissue swelling/inflammation/restriction, improving soft tissue extensibility and allowing for proper ROM for normal function with self care, mobility, lifting and ambulation. Approval Dates: by 11-30-21   CPT Code Units Approved Units Used  Date Updated:    TE 96 2   manual 96    E-stim  96 1   TA 96 1     Charges:  Timed Code Treatment Minutes: 40   Total Treatment Minutes: 55     [] EVAL (LOW) 73390 (typically 20 minutes face-to-face)  [] EVAL (MOD) 95212 (typically 30 minutes face-to-face)  [] EVAL (HIGH) 50994 (typically 45 minutes face-to-face)  [] RE-EVAL     [] EZ(29620) x     [] Dry needle 1 or 2 Muscles (94104)  [] NMR (75842) x     [] Dry needle 3+ Muscles (40663)  [] Manual (97639) x     [] Ultrasound (64570) x  [] TA (38466) x     [] Mech Traction (29678)  [] ES(attended) (47925)     [x] ES (un) (41043):   [] Vasopump (25948)  [] Ionto (59051)   [] Other:    GOALS:    Patient stated goal: Less pain in back and Legs  [] Progressing: [] Met: [] Not Met: [] Adjusted  Therapist goals for Patient:   Short Term Goals: To be achieved in: 2 weeks  1. Independent in HEP and progression per patient tolerance, in order to prevent re-injury. [] Progressing: [] Met: [] Not Met: [] Adjusted  2. Patient will have a decrease in pain to facilitate improvement in movement, function, and ADLs as indicated by Functional Deficits.    [] Progressing: [] Met: [] Not Met: [] Adjusted     Long Term Goals: To be achieved in: 6 weeks  1. Disability index score of 20% or less for the [place] to assist with reaching prior level of function. [] Progressing: [] Met: [] Not Met: [] Adjusted  2. Patient will demonstrate increased AROM to WNL, good LS mobility, good hip ROM to allow for proper joint functioning as indicated by patients Functional Deficits. [] Progressing: [] Met: [] Not Met: [] Adjusted  3. Patient will demonstrate an increase in Strength to good proximal hip and core activation to allow for proper functional mobility as indicated by patients Functional Deficits. [] Progressing: [] Met: [] Not Met: [] Adjusted  4. Patient will return to *usualfunctional activities without increased symptoms or restriction. [] Progressing: [] Met: [] Not Met: [] Adjusted    ASSESSMENT:  See eval    Treatment/Activity Tolerance:  [x] Patient tolerated treatment well [] Patient limited by fatique  [] Patient limited by pain  [] Patient limited by other medical complications  [x] Other:  Decreased pain after Rx. Overall Progression Towards Functional goals/ Treatment Progress Update:  [] Patient is progressing as expected towards functional goals listed. [] Progression is slowed due to complexities/Impairments listed. [] Progression has been slowed due to co-morbidities.   [x] Plan just implemented, too soon to assess goals progression <30days   [] Goals require adjustment due to lack of progress  [] Patient is not progressing as expected and requires additional follow up with physician  [] Other:    Prognosis for POC: [x] Good [] Fair  [] Poor    Patient requires continued skilled intervention: [x] Yes  [] No        PLAN: See eval  [] Continue per plan of care [] Alter current plan (see comments)  [x] Plan of care initiated [] Hold pending MD visit [] Discharge    Electronically signed by: Jodi Cardnoa,     Note: If patient does not return for scheduled/recommended follow up visits, this note will serve as a discharge from care along with the most recent update on progress. No oral lesions; no gross abnormalities

## 2021-10-06 PROBLEM — I10 ESSENTIAL HYPERTENSION: Status: ACTIVE | Noted: 2017-11-24

## 2022-08-09 ENCOUNTER — OUTPATIENT (OUTPATIENT)
Dept: OUTPATIENT SERVICES | Facility: HOSPITAL | Age: 57
LOS: 1 days | Discharge: HOME | End: 2022-08-09

## 2022-08-09 DIAGNOSIS — Z90.49 ACQUIRED ABSENCE OF OTHER SPECIFIED PARTS OF DIGESTIVE TRACT: Chronic | ICD-10-CM

## 2022-08-09 DIAGNOSIS — Z98.890 OTHER SPECIFIED POSTPROCEDURAL STATES: Chronic | ICD-10-CM

## 2022-08-09 DIAGNOSIS — Z12.31 ENCOUNTER FOR SCREENING MAMMOGRAM FOR MALIGNANT NEOPLASM OF BREAST: ICD-10-CM

## 2022-08-09 PROCEDURE — 77063 BREAST TOMOSYNTHESIS BI: CPT | Mod: 26

## 2022-08-09 PROCEDURE — 77067 SCR MAMMO BI INCL CAD: CPT | Mod: 26

## 2022-09-20 ENCOUNTER — APPOINTMENT (OUTPATIENT)
Dept: ORTHOPEDIC SURGERY | Facility: CLINIC | Age: 57
End: 2022-09-20

## 2023-08-08 ENCOUNTER — APPOINTMENT (OUTPATIENT)
Dept: ORTHOPEDIC SURGERY | Facility: CLINIC | Age: 58
End: 2023-08-08
Payer: MEDICARE

## 2023-08-08 VITALS — HEIGHT: 60 IN | WEIGHT: 144 LBS | BODY MASS INDEX: 28.27 KG/M2

## 2023-08-08 DIAGNOSIS — M65.332 TRIGGER FINGER, LEFT MIDDLE FINGER: ICD-10-CM

## 2023-08-08 PROCEDURE — 20550 NJX 1 TENDON SHEATH/LIGAMENT: CPT | Mod: F2

## 2023-08-08 PROCEDURE — 99202 OFFICE O/P NEW SF 15 MIN: CPT | Mod: 25

## 2023-08-08 NOTE — HISTORY OF PRESENT ILLNESS
[de-identified] : 58-year-old female has a left middle finger trigger digit.  Previously had surgery on the right side for carpal tunnel and trigger finger release and did well with that.  Now she comes in because of discomfort and locking of the left middle finger.

## 2023-08-08 NOTE — PROCEDURE
[FreeTextEntry3] : Trigger finger injection was performed because of pain inflammation and stiffness Anesthesia: ethyl chloride sprayed topically Dexamethasone injection of 1cc 4mg/ml Lidocaine: An injection of Lidocaine 1% 1cc  Patient has tried OTC's including aspirin, Ibuprofen, Aleve etc or prescription NSAIDS, and/or exercises at home and/ or physical therapy without satisfactory response. After verbal consent using sterile preparation and technique. The risks, benefits, and alternatives to cortisone injection were explained in full to the patient. Risks outlined include but are not limited to infection, sepsis, bleeding, scarring, skin discoloration, temporary increase in pain, syncopal episode, failure to resolve symptoms, allergic reaction, symptom recurrence, and elevation of blood sugar in diabetics. Patient understood the risks. All questions were answered. After discussion of options, patient requested an injection. Oral informed consent was obtained and sterile prep was done of the injection site. Sterile technique was utilized for the procedure including the preparation of the solutions used for the injection. Patient tolerated the procedure well. Advised to ice the injection site this evening. Prep with ETOH  locally to site. Sterile technique used.  tendon sheath injected Left middle finger flexor tendon sheath

## 2023-08-08 NOTE — ASSESSMENT
[FreeTextEntry1] : Patient is a left middle finger trigger digit.  Patient received a cortisone injection today.  If the injection does not work she will consider surgical intervention for trigger finger release.

## 2023-08-08 NOTE — PHYSICAL EXAM
[de-identified] : Patient is tender to palpation A1 pulley left middle finger.  Patient has good range of motion to the fingers.  No flexion contracture noted.  No Dupuytren's noted.

## 2023-09-08 ENCOUNTER — OUTPATIENT (OUTPATIENT)
Dept: OUTPATIENT SERVICES | Facility: HOSPITAL | Age: 58
LOS: 1 days | End: 2023-09-08
Payer: MEDICARE

## 2023-09-08 DIAGNOSIS — Z98.890 OTHER SPECIFIED POSTPROCEDURAL STATES: Chronic | ICD-10-CM

## 2023-09-08 DIAGNOSIS — Z12.31 ENCOUNTER FOR SCREENING MAMMOGRAM FOR MALIGNANT NEOPLASM OF BREAST: ICD-10-CM

## 2023-09-08 DIAGNOSIS — Z90.49 ACQUIRED ABSENCE OF OTHER SPECIFIED PARTS OF DIGESTIVE TRACT: Chronic | ICD-10-CM

## 2023-09-08 PROCEDURE — 77067 SCR MAMMO BI INCL CAD: CPT

## 2023-09-08 PROCEDURE — 77067 SCR MAMMO BI INCL CAD: CPT | Mod: 26

## 2023-09-08 PROCEDURE — 77063 BREAST TOMOSYNTHESIS BI: CPT | Mod: 26

## 2023-09-08 PROCEDURE — 77063 BREAST TOMOSYNTHESIS BI: CPT

## 2023-09-09 DIAGNOSIS — Z12.31 ENCOUNTER FOR SCREENING MAMMOGRAM FOR MALIGNANT NEOPLASM OF BREAST: ICD-10-CM

## 2023-10-01 ENCOUNTER — NON-APPOINTMENT (OUTPATIENT)
Age: 58
End: 2023-10-01

## 2023-10-02 DIAGNOSIS — Z87.828 PERSONAL HISTORY OF OTHER (HEALED) PHYSICAL INJURY AND TRAUMA: ICD-10-CM

## 2023-10-02 DIAGNOSIS — Z82.0 FAMILY HISTORY OF EPILEPSY AND OTHER DISEASES OF THE NERVOUS SYSTEM: ICD-10-CM

## 2023-10-02 DIAGNOSIS — Z92.89 PERSONAL HISTORY OF OTHER MEDICAL TREATMENT: ICD-10-CM

## 2023-10-02 DIAGNOSIS — E66.3 OVERWEIGHT: ICD-10-CM

## 2023-10-02 DIAGNOSIS — R41.3 OTHER AMNESIA: ICD-10-CM

## 2023-10-02 DIAGNOSIS — G37.9 DEMYELINATING DISEASE OF CENTRAL NERVOUS SYSTEM, UNSPECIFIED: ICD-10-CM

## 2023-10-02 DIAGNOSIS — Z82.69 FAMILY HISTORY OF OTHER DISEASES OF THE MUSCULOSKELETAL SYSTEM AND CONNECTIVE TISSUE: ICD-10-CM

## 2023-10-02 DIAGNOSIS — G56.23 LESION OF ULNAR NERVE, BILATERAL UPPER LIMBS: ICD-10-CM

## 2023-10-02 DIAGNOSIS — R41.0 DISORIENTATION, UNSPECIFIED: ICD-10-CM

## 2023-10-21 RX ORDER — LEVETIRACETAM 1000 MG/1
TABLET, FILM COATED ORAL
Refills: 0 | Status: DISCONTINUED | COMMUNITY
End: 2023-10-21

## 2023-11-01 RX ORDER — LEVETIRACETAM 500 MG/1
500 TABLET, FILM COATED, EXTENDED RELEASE ORAL
Qty: 180 | Refills: 3 | Status: ACTIVE | COMMUNITY
Start: 2023-10-21 | End: 1900-01-01

## 2023-12-19 ENCOUNTER — APPOINTMENT (OUTPATIENT)
Dept: NEUROLOGY | Facility: CLINIC | Age: 58
End: 2023-12-19
Payer: MEDICARE

## 2023-12-19 VITALS
WEIGHT: 143 LBS | DIASTOLIC BLOOD PRESSURE: 75 MMHG | RESPIRATION RATE: 15 BRPM | SYSTOLIC BLOOD PRESSURE: 109 MMHG | BODY MASS INDEX: 28.07 KG/M2 | HEART RATE: 59 BPM | HEIGHT: 60 IN | OXYGEN SATURATION: 96 %

## 2023-12-19 DIAGNOSIS — G40.209 LOCALIZATION-RELATED (FOCAL) (PARTIAL) SYMPTOMATIC EPILEPSY AND EPILEPTIC SYNDROMES WITH COMPLEX PARTIAL SEIZURES, NOT INTRACTABLE, W/OUT STATUS EPILEPTICUS: ICD-10-CM

## 2023-12-19 DIAGNOSIS — M50.90 CERVICAL DISC DISORDER, UNSPECIFIED, UNSPECIFIED CERVICAL REGION: ICD-10-CM

## 2023-12-19 DIAGNOSIS — M41.9 SCOLIOSIS, UNSPECIFIED: ICD-10-CM

## 2023-12-19 DIAGNOSIS — G56.03 CARPAL TUNNEL SYNDROM,BILATERAL UPPER LIMBS: ICD-10-CM

## 2023-12-19 DIAGNOSIS — M54.17 RADICULOPATHY, LUMBOSACRAL REGION: ICD-10-CM

## 2023-12-19 DIAGNOSIS — M54.2 CERVICALGIA: ICD-10-CM

## 2023-12-19 PROCEDURE — 99203 OFFICE O/P NEW LOW 30 MIN: CPT

## 2023-12-19 RX ORDER — METOPROLOL TARTRATE 25 MG/1
25 TABLET, FILM COATED ORAL DAILY
Refills: 0 | Status: ACTIVE | COMMUNITY

## 2023-12-19 RX ORDER — ROSUVASTATIN CALCIUM 5 MG/1
TABLET, FILM COATED ORAL
Refills: 0 | Status: ACTIVE | COMMUNITY

## 2023-12-19 RX ORDER — LOVASTATIN 10 MG/1
10 TABLET ORAL
Refills: 0 | Status: DISCONTINUED | COMMUNITY
End: 2023-12-19

## 2023-12-19 NOTE — PHYSICAL EXAM
[FreeTextEntry1] : General Appearance - Well groomed, Not in acute distress. Build & Nutrition - Well nourished.  Head and Neck Head - normocephalic, atraumatic with no lesions or palpable masses.  Neurologic Mental Status Affect - normal. Speech - Normal. Thought content/perception - Normal. Cognitive function - Normal. Cranial Nerves I Olfactory - Normal. II Optic - Visual fields - Normal. III Oculomotor - Normal Bilaterally. IV Trochlear - Bilateral - Normal - Bilateral. V Trigeminal - Normal Bilaterally. VI Abducens - Bilateral - Normal - Bilateral. VII Facial - Normal Bilaterally. VIII Acoustic - Bilateral - Hearing normal - Bilateral. IX Glossopharyngeal / X Vagus - Normal. XI Accessory - Normal Bilaterally. XII Hypoglossal - Bilateral - Normal - Bilateral. Sensory Light Touch - Decreased: Note: right distal median nerve distribution. Pain: Decreased: Note: same as light touch. Temperature: Decreased: Note: same as light touch. Vibration - Intact - Globally. Proprioception - Bilateral - Normal - Bilateral. Motor Bulk and Contour - Bulk and Contour - Atrophic - Note: mild left FDI. Tone - Normal. Strength - 5/5 normal muscle strength - All Muscles  (except graded weak muscles) . 5-/5 reduced muscle strength - Note: left hand intrinsics. Reflexes (Dermatomes) 3/2 Brisk - All. Clonus - Ankle - Bilateral - Non-sustained - Bilateral. Plantar Reflexes (L4-S2) - Bilateral - Flexion - Bilateral. Coordination - Walking on heels impaired. Gait - Medium-based. Frontal Release - Jaw Jerk  (mild) .  Results of Diagnostic Studies  Labs: Note: 8/4/17 blood work remarkable for hypogammaglobulinemia. 4/22/19 Keppra 25.3 (1000mg/day). 6/16/2020 Keppra 20.1 (1500mg/day). 4/4/22 Keppra 17.6 (1000mg/day).  Imaging: MRI: Note: 6/23/17 cervical spine: disc bulges at C4-5, C5-6 and C6-7 levels, no neural compromise; 6/26/17 lumbar spine: rotatory levoscoliosis. Reported moderate to marked spinal stenosis L4-5, marked left lateral subluxation of L4. Recommended CT of lumbar spine. 8/21/18 brain: few white matter lesions in bilateral cerebral hemispheres. Possible minimal encephalomalacia/choroid cyst left lateral inferior temporal region. CT Scan: Note: 7/12/17 myelogram of entire spine, post-myelogram CT, showed no obstruction of the entire spinal canal including L4-5 level, which was severely compromised in MRI study. Multilevel DJDs, multiple foraminal stenosis in cervical and lumbar region. Other - Note: 5-7/2017: X-ray of multiple joints: no obvious bone lesions.   Neurophysiological Testing - Note: July 31, 2017 NCS/EMG showed severe right carpal tunnel syndrome, mild left carpal tunnel syndrome, mild bilateral ulnar nerve entrapment at the elbow, generalized myopathic dysfunction. 8/21/18 EEG: intermittent sharp waves left frontotemporal region, rare bilateral temporal sharp waves. 10/9/18 Eps: normal in JASPAL, EVELIO, SSEP of UEs, mild delay of right lower extremity. 10/1/19 EEG: intermittent sharp waves left temporal region. 6/23/21 EEG: left temporal epileptiform discharges.  Neuropsychiatric Mental status exam performed with findings of - no evidence of hallucinations, delusions, obsessions or homicidal/suicidal ideation.  Musculoskeletal Spine/Ribs/Pelvis Cervical Spine - Examination of the cervical spine reveals - normal posture  (right more than left paracervical spinal muscle rigidity. Limited ROM to all directions) . Thoracic (Dorsal) Spine - Examination of the thoracic spine reveals - no tenderness over thoracic vertebrae, no pain, no paraspinous muscle spasm and normal thoracic spine movements. Lumbosacral Spine - Evaluation of related systems reveals - Straight leg raising negative. Inspection and Palpation - Tenderness - mild. Muscle tone - rigidity  (left) . Lumbosacral Spine - Deformities/Malalignments/Discrepancies - scoliosis  (levoscoliosis) . Upper Extremity  Ulna: Inspection and Palpation - Tenderness - Tinel sign at cubital tunnel not present. Hand/Wrist: Wrist: Inspection and Palpation - Tenderness - Tinel sign of carpal tunnel  (positive right side) and Phalan sign of wrist negative.

## 2023-12-19 NOTE — DISCUSSION/SUMMARY
[FreeTextEntry1] : Neurologically stable. Obtain EEG and Keppra level. Continue same dosage for now. Orthopedic to have more care for left carpal tunnel syndrome. Continue PT for neck pain and low back pain.

## 2023-12-19 NOTE — HISTORY OF PRESENT ILLNESS
[FreeTextEntry1] : Last office visit note 3/18/2022.  Memory difficulty. Still with episodes of memory lapses. No episodes of confusion. No recurrent tinnitus. No real episodes of sharp stinging pain of right side on the skull.  Reasonable tolerance and content with treatment with Keppra.  "Numbness and tingling": No worsening of numbness of fingers. Combination of trigger fingers and carpal tunnel syndrome. No weakness. Carpal tunnel splint does not help. She tried injectable treatment without benefit. She underwent surgical treatment with improvement of symptoms of right hand. She is to treat the left hand after the holiday season.  Stiff neck. Ongoing chiropractic treatment.  Almost constant low back pain despite ongoing PT. She wears back brace as needed.  Ongoing follow up by hematologist. No need for bone marrow biopsy now.

## 2024-03-30 NOTE — ASU PREOP CHECKLIST - AS BP NONINV METHOD
..ED TO INPATIENT ADMISSION HANDOFF:  *Please review chart for additional information*    SPECIAL CONSIDERATIONS:     none    Chief Complaint   Patient presents with    Chest Pain    Shortness of Breath        ED Triage Notes       Ericka Espinosa RN 3/29/2024 20:10                 Right lower chest pain, radiating into right flank, and SOB x 1 hour. Pt reports pain is worse w/deep breaths.           DIAGNOSIS: No diagnosis found.       CODE STATUS:   Code Status: Prior     ALLERGIES:   Allergen Reactions    Adhesive   (Environmental) Other (See Comments)     Tape/ bandaid (not paper tape)  Reaction- \"tears skin\"    Codeine Other (See Comments)     Upset stomach nausea    Meloxicam RASH    Morphine Other (See Comments)     Hallucinations if given long term, short term ok     Vitamin D Other (See Comments)     With extra nutrients, causes extra  Swelling in lower extremeties        ISOLATION: No active isolations     INFECTION STATUS: No active infections    OTHER INFECTIONS THAT REQUIRE STANDARD PRECAUTIONS:  [] N/A []  Bed Bugs []  HIV    [] Hepatitis []  Wound with possible infection and/or controlled drainage    ORIENTATION STATUS: A&Ox4    AFFECT: [x] Calm & Cooperative   [] Other: none    AMBULATION:    [x] Independently  [] Standby assistance [] Cane   [] Walker  [] Wheelchair  [] Bedridden   [] Crutches  [] Unknown and./or unable to assess             FALL RISK: [x] Yes    [] No    ELIMINATION HABITS:   [x] Bathroom  [] PureWick/External Catheter [] Bedpan    [] Incontinent [] Bedside commode   [] Gutierrez   [] Urinal   [] Unknown and/or unable to assess    TELEMETRY:  [x] Yes [] No    OXYGEN STATUS: none    LINES, TUBES, DRAINS:  PIV ACCESS:   [] Inserted prior to ED arrival  [x] Inserted while in ED  [] N/A  GUTIERREZ:    [] Placed upon ED arrival    [] Chronic   [] Chronic - replaced in ED  [x] N/A   CENTRAL VENOUS ACCESS:    [] Port    [] Accessed prior to ED arrival    [] Accessed in ED   [] PICC line   []  Midline   [x] N/A  LIMB ALERT:   [] Left [] Right  [x] N/A  BLOOD PRODUCTS INFUSING AT TIME OF TRANSFER TO FLOOR:    [] Yes [x] No     SEPSIS BUNDLE:   [x] N/A    SEPSIS ITEMS COMPLETED IN ED:  [] Initial Lactic Acid  [] Antibiotics started [] Fluid bolus started [] Frequent vital signs  [] Blood Cultures x2 [] Fluid bolus finished [] Repeat Lactic Acid    **ED RN REQUIRED TO ATTEMPT VERBAL HANDOFF FOR PATIENTS RECEIVING SEPSIS BUNDLE**    LIVING SITUATION: alone    OTHER:  PREFERRED LANGUAGE: english  PRONOUN:    [] He/Him/His/Himself   [x] She/Her/Hers/Herself   [] They/Their/Theirs/Themself  Patient identified as High Risk Patient per current policy due to:    [x] N/A   [] Patients whose primary reason for the ED visit is an acute behavioral condition.   [] Staff have reason to believe that the patient may cause harm to themselves or others while in the clinical setting.    [] Patients brought to the ED by law enforcement for behavioral health reasons or substance abuse/intoxication issues and remaining in the ED for care.   [] Patients for whom Public Safety/Security is called to assist.   PETITION & CERTIFICATION:   [] Involuntary   [] Voluntary   [x] N/A    ABNORMAL/OUTSTANDING LABS:    Labs Reviewed   COMPREHENSIVE METABOLIC PANEL - Abnormal; Notable for the following components:       Result Value    Glucose 191 (*)     BUN 37 (*)     Creatinine 1.38 (*)     Glomerular Filtration Rate 39 (*)     BUN/Cr 27 (*)     Albumin 3.5 (*)     A/G Ratio 0.9 (*)     All other components within normal limits   CBC WITH AUTOMATED DIFFERENTIAL (PERFORMABLE ONLY) - Abnormal; Notable for the following components:    RBC 3.66 (*)     HGB 11.2 (*)     HCT 35.5 (*)     MCHC 31.5 (*)     All other components within normal limits   URINALYSIS WITH MICROSCOPY WITHOUT CULTURE - Abnormal; Notable for the following components:    GLUCOSE, URINALYSIS >1000 (*)     All other components within normal limits   TROPONIN I, HIGH  SENSITIVITY - Normal   NT PROBNP - Normal   LIPASE - Normal   CBC WITH DIFFERENTIAL    Narrative:     The following orders were created for panel order CBC with Automated Differential.  Procedure                               Abnormality         Status                     ---------                               -----------         ------                     CBC with Automated Dif...[[id number]]  Abnormal            Final result                 Please view results for these tests on the individual orders.   RAINBOW DRAW    Narrative:     The following orders were created for panel order [place] Draw Light Blue Top Collected? 1 Label; Red Top Collected? No Labels; Light Green Top Collected? 1 Label; Lavender Top Collected? 1 Label; Gold Top Collected? 1 Label; Pink Top Collected? No Labels; Grey Top Collected? No Labels.  Procedure                               Abnormality         Status                     ---------                               -----------         ------                     Light Blue Top[[id number]]                                 In process                 Light Green Top[[id number]]                                In process                 Lavender Top[[id number]]                                   In process                 Gold Top[[id number]]                                       In process                   Please view results for these tests on the individual orders.   LIGHT BLUE TOP   LIGHT GREEN TOP   LAVENDER TOP   GOLD TOP           Please call ED RN for additional questions, clarification, or sensitive information: bridget salas, MADHAVI ()     electronic

## 2024-06-27 ENCOUNTER — APPOINTMENT (OUTPATIENT)
Dept: NEUROLOGY | Facility: CLINIC | Age: 59
End: 2024-06-27
Payer: MEDICARE

## 2024-06-27 ENCOUNTER — NON-APPOINTMENT (OUTPATIENT)
Age: 59
End: 2024-06-27

## 2024-06-27 PROCEDURE — 95816 EEG AWAKE AND DROWSY: CPT

## 2024-07-16 ENCOUNTER — OUTPATIENT (OUTPATIENT)
Dept: OUTPATIENT SERVICES | Facility: HOSPITAL | Age: 59
LOS: 1 days | End: 2024-07-16
Payer: MEDICARE

## 2024-07-16 DIAGNOSIS — Z98.890 OTHER SPECIFIED POSTPROCEDURAL STATES: Chronic | ICD-10-CM

## 2024-07-16 DIAGNOSIS — Z00.8 ENCOUNTER FOR OTHER GENERAL EXAMINATION: ICD-10-CM

## 2024-07-16 DIAGNOSIS — R07.9 CHEST PAIN, UNSPECIFIED: ICD-10-CM

## 2024-07-16 DIAGNOSIS — Z90.49 ACQUIRED ABSENCE OF OTHER SPECIFIED PARTS OF DIGESTIVE TRACT: Chronic | ICD-10-CM

## 2024-07-16 PROCEDURE — 75574 CT ANGIO HRT W/3D IMAGE: CPT

## 2024-07-16 PROCEDURE — 75574 CT ANGIO HRT W/3D IMAGE: CPT | Mod: 26

## 2024-07-17 DIAGNOSIS — R07.9 CHEST PAIN, UNSPECIFIED: ICD-10-CM

## 2024-08-07 ENCOUNTER — NON-APPOINTMENT (OUTPATIENT)
Age: 59
End: 2024-08-07

## 2024-08-08 ENCOUNTER — APPOINTMENT (OUTPATIENT)
Dept: NEUROLOGY | Facility: CLINIC | Age: 59
End: 2024-08-08

## 2024-08-08 PROBLEM — Z87.39 HISTORY OF NECK PAIN: Status: RESOLVED | Noted: 2023-10-02 | Resolved: 2024-08-08

## 2024-08-08 PROBLEM — G56.23 ENTRAPMENT OF BOTH ULNAR NERVES AT ELBOW: Status: RESOLVED | Noted: 2023-10-02 | Resolved: 2024-08-08

## 2024-08-08 PROCEDURE — 95912 NRV CNDJ TEST 11-12 STUDIES: CPT

## 2024-08-08 PROCEDURE — 95885 MUSC TST DONE W/NERV TST LIM: CPT | Mod: 59

## 2024-08-08 PROCEDURE — 99213 OFFICE O/P EST LOW 20 MIN: CPT | Mod: 25

## 2024-08-08 PROCEDURE — 95886 MUSC TEST DONE W/N TEST COMP: CPT

## 2024-08-08 NOTE — HISTORY OF PRESENT ILLNESS
[FreeTextEntry1] : Memory difficulty. Still with episodes of memory lapses. No episodes of confusion. No recurrent tinnitus. No real episodes of sharp stinging pain of right side on the skull.  Reasonable tolerance and content with treatment with Keppra. Had Keppra level checked. REEG was done since last visit. Results reviewed with patient.  She returns for electrodiagnostic studies today due to worsening of symptoms of left hand, presumably from CTS. Dr. Kern prefers to have a more current study to compare with study in 2017 before choosing treatment option for her.   She also developed dense numbness of right foot in the sole about 4 months ago with no inciting event. She has chronic persistent low back pain. She states that the pain is constant without changes throughout the day. The left calf is stiff and sore sometimes. She denies weakness or severe neck pain.  Ongoing follow up by hematologist. No need for bone marrow biopsy now.

## 2024-08-08 NOTE — PHYSICAL EXAM
[FreeTextEntry1] : General Appearance - Well groomed, Not in acute distress. Build & Nutrition - Well nourished.  Head and Neck Head - normocephalic, atraumatic with no lesions or palpable masses.  Neurologic Mental Status Affect - normal. Speech - Normal. Thought content/perception - Normal. Cognitive function - Normal. Cranial Nerves I Olfactory - Normal. II Optic - Visual fields - Normal. III Oculomotor - Normal Bilaterally. IV Trochlear - Bilateral - Normal - Bilateral. V Trigeminal - Normal Bilaterally. VI Abducens - Bilateral - Normal - Bilateral. VII Facial - Normal Bilaterally. VIII Acoustic - Bilateral - Hearing normal - Bilateral. IX Glossopharyngeal / X Vagus - Normal. XI Accessory - Normal Bilaterally. XII Hypoglossal - Bilateral - Normal - Bilateral. Sensory Light Touch - Decreased: Note: left distal median nerve distribution and right sole. Pain: Decreased: Note: same as light touch. Temperature: Decreased: Note: same as light touch. Vibration - Intact - Globally. Proprioception - Bilateral - Normal - Bilateral. Motor Bulk and Contour - Bulk and Contour - normal. Tone - Normal. Strength - 5/5 normal muscle strength - All Muscles (except graded weak muscles). 5-/5 reduced muscle strength - Note: right plantar flexion Reflexes (Dermatomes) 3/2 Brisk - except right ankle jerk. Right ankle jerk: 1+/2. Clonus - Ankle - Bilateral - Non-sustained - Bilateral. Plantar Reflexes (L4-S2) - Bilateral - Flexion - Bilateral. Coordination - Walking on heels impaired. Gait - Medium-based. Frontal Release - Jaw Jerk (mild).  Results of Diagnostic Studies  Labs: Note: 8/4/17 blood work remarkable for hypogammaglobulinemia. 4/22/19 Keppra 25.3 (1000mg/day). 6/16/2020 Keppra 20.1 (1500mg/day). 4/4/22 Keppra 17.6 (1000mg/day). 4/30/24 Keppra 27.0 (1000mg/day)  Imaging: MRI: Note: 6/23/17 cervical spine: disc bulges at C4-5, C5-6 and C6-7 levels, no neural compromise; 6/26/17 lumbar spine: rotatory levoscoliosis. Reported moderate to marked spinal stenosis L4-5, marked left lateral subluxation of L4. Recommended CT of lumbar spine. 8/21/18 brain: few white matter lesions in bilateral cerebral hemispheres. Possible minimal encephalomalacia/choroid cyst left lateral inferior temporal region. CT Scan: Note: 7/12/17 myelogram of entire spine, post-myelogram CT, showed no obstruction of the entire spinal canal including L4-5 level, which was severely compromised in MRI study. Multilevel DJDs, multiple foraminal stenosis in cervical and lumbar region. Other - Note: 5-7/2017: X-ray of multiple joints: no obvious bone lesions.  Neurophysiological Testing - Note: July 31, 2017 NCS/EMG showed severe right carpal tunnel syndrome, mild left carpal tunnel syndrome, mild bilateral ulnar nerve entrapment at the elbow, generalized myopathic dysfunction. 8/21/18 EEG: intermittent sharp waves left frontotemporal region, rare bilateral temporal sharp waves. 10/9/18 Eps: normal in JASPAL, EVELIO, SSEP of UEs, mild delay of right lower extremity. 10/1/19 EEG: intermittent sharp waves left temporal region. 6/23/21 EEG: left temporal epileptiform discharges. 6/27/24 REEG: reported normal. 8/8/24 NCS/EMG: moderate to severe left CTS, chronic active right S1 radiculopathy, mild L4 and L5 radiculopathy.  Neuropsychiatric Mental status exam performed with findings of - no evidence of hallucinations, delusions, obsessions or homicidal/suicidal ideation.

## 2024-08-08 NOTE — PROCEDURE
[FreeTextEntry1] : 8/8/24 NCS/EMG: moderate to severe left CTS, progression since 2017, chronic active right S1 radiculopathy, mild L4 and L5 radiculopathy.

## 2024-08-08 NOTE — DISCUSSION/SUMMARY
[FreeTextEntry1] : Excellent seizure control. Will continue same dosage of Keppra. Proceed to have lumbar spine MRI. Follow up with Dr. Kern regarding further management of left CTS.

## 2024-09-10 ENCOUNTER — OUTPATIENT (OUTPATIENT)
Dept: OUTPATIENT SERVICES | Facility: HOSPITAL | Age: 59
LOS: 1 days | End: 2024-09-10
Payer: MEDICARE

## 2024-09-10 DIAGNOSIS — Z90.49 ACQUIRED ABSENCE OF OTHER SPECIFIED PARTS OF DIGESTIVE TRACT: Chronic | ICD-10-CM

## 2024-09-10 DIAGNOSIS — Z98.890 OTHER SPECIFIED POSTPROCEDURAL STATES: Chronic | ICD-10-CM

## 2024-09-10 DIAGNOSIS — Z12.31 ENCOUNTER FOR SCREENING MAMMOGRAM FOR MALIGNANT NEOPLASM OF BREAST: ICD-10-CM

## 2024-09-10 PROCEDURE — 77063 BREAST TOMOSYNTHESIS BI: CPT

## 2024-09-10 PROCEDURE — 77067 SCR MAMMO BI INCL CAD: CPT | Mod: 26

## 2024-09-10 PROCEDURE — 77063 BREAST TOMOSYNTHESIS BI: CPT | Mod: 26

## 2024-09-10 PROCEDURE — 77067 SCR MAMMO BI INCL CAD: CPT

## 2024-09-11 DIAGNOSIS — Z12.31 ENCOUNTER FOR SCREENING MAMMOGRAM FOR MALIGNANT NEOPLASM OF BREAST: ICD-10-CM

## 2024-10-15 ENCOUNTER — OUTPATIENT (OUTPATIENT)
Dept: OUTPATIENT SERVICES | Facility: HOSPITAL | Age: 59
LOS: 1 days | End: 2024-10-15
Payer: MEDICARE

## 2024-10-15 DIAGNOSIS — Z90.49 ACQUIRED ABSENCE OF OTHER SPECIFIED PARTS OF DIGESTIVE TRACT: Chronic | ICD-10-CM

## 2024-10-15 DIAGNOSIS — Z98.890 OTHER SPECIFIED POSTPROCEDURAL STATES: Chronic | ICD-10-CM

## 2024-10-15 DIAGNOSIS — R07.9 CHEST PAIN, UNSPECIFIED: ICD-10-CM

## 2024-10-15 PROCEDURE — 75580 N-INVAS EST C FFR SW ALY CTA: CPT

## 2024-10-15 PROCEDURE — 75580 N-INVAS EST C FFR SW ALY CTA: CPT | Mod: 26

## 2024-10-16 DIAGNOSIS — R07.9 CHEST PAIN, UNSPECIFIED: ICD-10-CM

## 2025-07-02 PROBLEM — G37.9 DEMYELINATING CHANGES IN BRAIN: Status: RESOLVED | Noted: 2023-10-02 | Resolved: 2025-07-02

## 2025-08-19 DIAGNOSIS — G40.209 LOCALIZATION-RELATED (FOCAL) (PARTIAL) SYMPTOMATIC EPILEPSY AND EPILEPTIC SYNDROMES WITH COMPLEX PARTIAL SEIZURES, NOT INTRACTABLE, W/OUT STATUS EPILEPTICUS: ICD-10-CM

## 2025-09-20 ENCOUNTER — TRANSCRIPTION ENCOUNTER (OUTPATIENT)
Age: 60
End: 2025-09-20